# Patient Record
Sex: FEMALE | Race: WHITE | URBAN - METROPOLITAN AREA
[De-identification: names, ages, dates, MRNs, and addresses within clinical notes are randomized per-mention and may not be internally consistent; named-entity substitution may affect disease eponyms.]

---

## 2022-07-14 ENCOUNTER — OFFICE (OUTPATIENT)
Dept: URBAN - METROPOLITAN AREA TELEHEALTH 3 | Facility: TELEHEALTH | Age: 22
End: 2022-07-14

## 2022-07-14 VITALS — WEIGHT: 145 LBS | HEIGHT: 60 IN

## 2022-07-14 DIAGNOSIS — K21.9 GASTRO-ESOPHAGEAL REFLUX DISEASE WITHOUT ESOPHAGITIS: ICD-10-CM

## 2022-07-14 DIAGNOSIS — R10.13 EPIGASTRIC PAIN: ICD-10-CM

## 2022-07-14 DIAGNOSIS — R11.0 NAUSEA: ICD-10-CM

## 2022-07-14 PROCEDURE — 99204 OFFICE O/P NEW MOD 45 MIN: CPT | Performed by: PHYSICIAN ASSISTANT

## 2022-07-14 NOTE — SERVICEHPINOTES
PATIENT VERIFIED BY DATE OF BIRTH AND NAME. Patient has been consented for this telecommunication visit.   Pt is a 21 yr old female here to discuss chronic abdominal pain. She tells me that she has gone to the ER times in the past few weeks d/t the pain. Pain has been bad in the past one month. Pain is located in the epigastrium and it is continuous. Pain is worse with PO intake with associated nausea. Yesterday was the first time she vomited was yesterday. She was prescribed oxycodone and Famotidine. Her famotidine was increased from 20 to 40 mg once per day so far it hasn't helped. Has heartburn very badly but doesn't have acid reflux. No dysphagia or melena-- (had an episode of dark stool but per patient had negative FOBT in the ER). Has lost 20 lbs in the past month or so per patient. Always feels full and is never hungry. Takes Naproxen 800 mg for chronic pain from endometriosis. When she takes Naproxen, it exacerbates her pain.  Per ER notes, she has been to the ER 5 + times and to 3 + facilities in 12 months (13 ER visits in total). Her last visit 7/11/22 showed a normal CT scan with contrast, normal x ray, normal CMP, lipase, and no anemia upon CBC. No tobacco or ETOH use. She checked chest pain but it is really epigastric/luq pain-not actual chest pain. Can climb one flight of stairs w/o stopping d/t SOB. No known family hx of GI issues. ROS as per HPI and otherwise is unremarkable.

## 2022-10-05 ENCOUNTER — ON CAMPUS - OUTPATIENT (OUTPATIENT)
Dept: URBAN - METROPOLITAN AREA HOSPITAL 16 | Facility: HOSPITAL | Age: 22
End: 2022-10-05
Payer: MEDICAID

## 2022-10-05 DIAGNOSIS — K29.60 OTHER GASTRITIS WITHOUT BLEEDING: ICD-10-CM

## 2022-10-05 DIAGNOSIS — K21.9 GASTRO-ESOPHAGEAL REFLUX DISEASE WITHOUT ESOPHAGITIS: ICD-10-CM

## 2022-10-05 DIAGNOSIS — R11.0 NAUSEA: ICD-10-CM

## 2022-10-05 PROCEDURE — 43239 EGD BIOPSY SINGLE/MULTIPLE: CPT | Performed by: INTERNAL MEDICINE

## 2022-11-09 ENCOUNTER — TELEHEALTH PROVIDED IN PATIENT'S HOME (OUTPATIENT)
Dept: URBAN - METROPOLITAN AREA TELEHEALTH 3 | Facility: TELEHEALTH | Age: 22
End: 2022-11-09
Payer: MEDICAID

## 2022-11-09 VITALS — HEIGHT: 60 IN | WEIGHT: 143 LBS

## 2022-11-09 DIAGNOSIS — R10.13 EPIGASTRIC PAIN: ICD-10-CM

## 2022-11-09 DIAGNOSIS — R74.8 ABNORMAL LEVELS OF OTHER SERUM ENZYMES: ICD-10-CM

## 2022-11-09 DIAGNOSIS — R11.0 NAUSEA: ICD-10-CM

## 2022-11-09 DIAGNOSIS — R19.5 OTHER FECAL ABNORMALITIES: ICD-10-CM

## 2022-11-09 DIAGNOSIS — K21.9 GASTRO-ESOPHAGEAL REFLUX DISEASE WITHOUT ESOPHAGITIS: ICD-10-CM

## 2022-11-09 PROCEDURE — 99214 OFFICE O/P EST MOD 30 MIN: CPT | Mod: 95 | Performed by: PHYSICIAN ASSISTANT

## 2022-11-09 NOTE — SERVICEHPINOTES
PATIENT VERIFIED BY DATE OF BIRTH AND NAME. Patient has been consented for this telecommunication visit.   Pt is here for f/u regarding epigastric pain.   She has been to the ER 14 times in the past 12 months for "severe" epigastric pain. ER evaluation has been unremarkable--normal CBC, CMP, lipase, and CT scan of the ab/pelvis previously. She went again to the ER 11/4/22--imaging not done as per ER notes, she had two recent CTs there that were unremarkable. Lab work did show a slightly high ALT and total bilirubin. 
br
eneNo specific triggers to the symptoms specifically other than fast foods--she has cut out fried foods and acidic foods too. No alleviating factors. She gets nauseous a lot too as an associated symptom. Has good days and bad days. Can go days w/o eating. Can vomit up stomach acid. 
br
ene luque She tells me that for the past several months, all of her stools have been loose. She tends to go to the bathroom also when she has nausea and the epigastric pain. No blood in the stool. Has lost about 30 lbs since everything began. ene luque
We obtained an EGD on 10/5/22 which showed gastritis. It was recommended to continue Omeprazole and f/u in the office. She states that the Omeprazole didn't do anything for her stomach--took it for about 45 days in total. Hasn't tried any other antiacids. 
ene luque
No prior colonoscopy. 
ene luque
No other GI related complaints today. ROS as per HPI and otherwise is unremarkable.

## 2022-11-22 ENCOUNTER — TELEHEALTH PROVIDED OTHER THAN IN PATIENT'S HOME (OUTPATIENT)
Dept: URBAN - METROPOLITAN AREA TELEHEALTH 12 | Facility: TELEHEALTH | Age: 22
End: 2022-11-22
Payer: MEDICAID

## 2022-11-22 VITALS — WEIGHT: 143 LBS | HEIGHT: 60 IN

## 2022-11-22 DIAGNOSIS — R10.13 EPIGASTRIC PAIN: ICD-10-CM

## 2022-11-22 DIAGNOSIS — R19.5 OTHER FECAL ABNORMALITIES: ICD-10-CM

## 2022-11-22 PROCEDURE — 99214 OFFICE O/P EST MOD 30 MIN: CPT | Mod: 95 | Performed by: PHYSICIAN ASSISTANT

## 2022-11-22 NOTE — SERVICEHPINOTES
PATIENT VERIFIED BY DATE OF BIRTH AND NAME. Patient has been consented for this telecommunication visit.   Pt is here for f/u regarding epigastric pain. She has been to the ER  14 times for this.   We obtained an EGD on 10/5/22 which showed gastritis. It was recommended to continue Omeprazole and f/u in the office. She states that the Omeprazole didn't do anything for her stomach--took it for about 45 days in total. At her last visit two weeks ago, I switched her to Pantoprazole to see if that would help.  ER evaluation has been unremarkable--normal CBC, CMP, lipase, and CT scan of the ab/pelvis previously. She went again to the ER 11/4/22--imaging not done as per ER notes, she had two recent CTs there that were unremarkable No specific triggers to the symptoms specifically other than fast foods--she has cut out fried foods and acidic foods too. No alleviating factors. She gets nauseous a lot too as an associated symptom. Has good days and bad days. Can go days w/o eating. Can vomit up stomach acid. brShe tells me that for the past several months, all of her stools have been loose. She tends to go to the bathroom also when she has nausea and the epigastric pain. No blood in the stool. Has lost about 30 lbs since everything began. I ordered stool studies and a colonoscopy--results pending. I also ordered a HIDA scan with [facility] which was normal. 
br
br
Since switching to Pantoprazole, she hasn't noticed any difference in her symptoms. Did try Dicyclomine at one point 10 mg and it seemed to help. Wants to try this again. 
br
ROS as per HPI and o/w is unremarkable. No other GI related complaints today. br
br
br

## 2022-11-29 LAB
AMBIG ABBREV HFP7 DEFAULT: (no result)
BILIRUBIN, TOTAL/DIRECT, SERUM: BILIRUBIN, INDIRECT: 1.35 MG/DL — HIGH (ref 0.1–0.8)
HEPATIC FUNCTION PANEL (7): ALBUMIN: 4.8 G/DL (ref 3.9–5)
HEPATIC FUNCTION PANEL (7): ALKALINE PHOSPHATASE: 57 IU/L (ref 44–121)
HEPATIC FUNCTION PANEL (7): ALT (SGPT): 44 IU/L — HIGH (ref 0–32)
HEPATIC FUNCTION PANEL (7): AST (SGOT): 29 IU/L (ref 0–40)
HEPATIC FUNCTION PANEL (7): BILIRUBIN, DIRECT: 0.25 MG/DL (ref 0–0.4)
HEPATIC FUNCTION PANEL (7): BILIRUBIN, TOTAL: 1.6 MG/DL — HIGH (ref 0–1.2)
HEPATIC FUNCTION PANEL (7): PROTEIN, TOTAL: 7 G/DL (ref 6–8.5)

## 2022-12-02 ENCOUNTER — OFFICE (OUTPATIENT)
Dept: URBAN - METROPOLITAN AREA CLINIC 102 | Facility: CLINIC | Age: 22
End: 2022-12-02

## 2022-12-02 PROCEDURE — 00031: CPT | Performed by: INTERNAL MEDICINE

## 2022-12-19 ENCOUNTER — ON CAMPUS - OUTPATIENT (OUTPATIENT)
Dept: URBAN - METROPOLITAN AREA HOSPITAL 65 | Facility: HOSPITAL | Age: 22
End: 2022-12-19
Payer: MEDICAID

## 2022-12-19 DIAGNOSIS — R10.13 EPIGASTRIC PAIN: ICD-10-CM

## 2022-12-19 DIAGNOSIS — R19.7 DIARRHEA, UNSPECIFIED: ICD-10-CM

## 2022-12-19 PROCEDURE — 45380 COLONOSCOPY AND BIOPSY: CPT | Performed by: INTERNAL MEDICINE

## 2022-12-28 ENCOUNTER — TELEHEALTH PROVIDED OTHER THAN IN PATIENT'S HOME (OUTPATIENT)
Dept: URBAN - METROPOLITAN AREA TELEHEALTH 7 | Facility: TELEHEALTH | Age: 22
End: 2022-12-28
Payer: MEDICAID

## 2022-12-28 VITALS — HEIGHT: 60 IN | WEIGHT: 135 LBS

## 2022-12-28 DIAGNOSIS — K58.9 IRRITABLE BOWEL SYNDROME WITHOUT DIARRHEA: ICD-10-CM

## 2022-12-28 DIAGNOSIS — R10.13 EPIGASTRIC PAIN: ICD-10-CM

## 2022-12-28 DIAGNOSIS — K31.84 GASTROPARESIS: ICD-10-CM

## 2022-12-28 PROCEDURE — 99214 OFFICE O/P EST MOD 30 MIN: CPT | Mod: 95 | Performed by: INTERNAL MEDICINE

## 2022-12-28 NOTE — SERVICEHPINOTES
PATIENT VERIFIED BY DATE OF BIRTH AND NAME. Patient has been consented for this telecommunication visit.   21 yo fm with abdm pain for some time.  Pt had EGD and Colonoscopy - both normal overall.  Pt had HIDA and US negative.  Pt has N/V for some time as well.  Pt has frequent emesis.  Mostly sx are episodic.  Pt has some good days and some bad days.  Pt states limits her getting out of bed, cannot work for the most part.  Pt has a hx of endometriosis and is on Lupron for this.  Pt with hx of Sz disorder and ?Bipolar but not on meds for this.  Pt ROS stable.  Lives with her wife and father.

## 2023-10-11 ENCOUNTER — TELEHEALTH PROVIDED OTHER THAN IN PATIENT'S HOME (OUTPATIENT)
Dept: URBAN - METROPOLITAN AREA TELEHEALTH 3 | Facility: TELEHEALTH | Age: 23
End: 2023-10-11

## 2023-10-11 VITALS — WEIGHT: 125 LBS | HEIGHT: 60 IN

## 2023-10-11 DIAGNOSIS — R11.0 NAUSEA: ICD-10-CM

## 2023-10-11 DIAGNOSIS — R10.13 EPIGASTRIC PAIN: ICD-10-CM

## 2023-10-11 DIAGNOSIS — K21.9 GASTRO-ESOPHAGEAL REFLUX DISEASE WITHOUT ESOPHAGITIS: ICD-10-CM

## 2023-10-11 DIAGNOSIS — R93.89 ABNORMAL FINDINGS ON DIAGNOSTIC IMAGING OF OTHER SPECIFIED B: ICD-10-CM

## 2023-10-11 DIAGNOSIS — K76.9 LIVER DISEASE, UNSPECIFIED: ICD-10-CM

## 2023-10-11 DIAGNOSIS — R74.8 ABNORMAL LEVELS OF OTHER SERUM ENZYMES: ICD-10-CM

## 2023-10-11 PROCEDURE — 99215 OFFICE O/P EST HI 40 MIN: CPT | Mod: 95 | Performed by: PHYSICIAN ASSISTANT

## 2023-10-11 NOTE — SERVICEHPINOTES
PATIENT VERIFIED BY DATE OF BIRTH AND NAME. Patient has been consented for this telecommunication visit.   Pt is here to discuss high LFTs. Was in the ER for ab pain (has chronic ab pain)--last saw Dr. Madden--thought to be NUD vs. functional vs. Gastroparesis--he ordered a GI emptying scan which she didn't complete 2nd to vomiting both times during the test. Labs 11/22 showed a mildly high ALT at 44 and t bili was 1.6 and indirect was 1.35. However, she went to the ER 9/20/23 and labs as follows--t bili 2.0, , , and drug screen + for cannabis.  Habitually, pt goes to the ER often for ab pain--per patient, has gone about 20 times this year. The visit on 9/20/23--pain was more chronic compared to her usual pain. Vomiting spells were also more intense this most recent times. Has upper abdominal pain still. She has chronic, intermittent lack of appetite--no change here. ene luque
Regarding, high LFTs--she was told about 1 yr ago, that her lfts were also high. U/S 07/22 showed a liver lesion--could be a hemangioma but hepatic adenoma not excluded. She used to be a heavier cannabis user--now still uses daily but in less amounts. No other illicit drugs. Has been ETOH free for at least 12 months. Doesn't use any herbal supplements. No abuse of tylenol or NSAIDS. No known family hx of liver disease--though both her mom and sister are being evaluated for high LFTs. ene luque
Used to take oral OCPs--in 2019, then was on lupron, was going to see a fertility clinic but this has been put on the back burner for now d/t high LFTs. 
ene luque
No other GI related complaints today. ROS as per HPI and o/w is unremarkable.

## 2023-10-11 NOTE — SERVICENOTES
Patient's visit was conducted through [employer] telecommunication. Patient consented before the start of visit as to understanding of privacy concerns, possible technological failure, and their responsibility of carrying out instructions of plan.

## 2025-04-29 ENCOUNTER — TELEHEALTH PROVIDED IN PATIENT'S HOME (OUTPATIENT)
Dept: URBAN - METROPOLITAN AREA TELEHEALTH 12 | Facility: TELEHEALTH | Age: 25
End: 2025-04-29
Payer: COMMERCIAL

## 2025-04-29 VITALS — HEIGHT: 60 IN | WEIGHT: 155 LBS

## 2025-04-29 DIAGNOSIS — K92.9 DISEASE OF DIGESTIVE SYSTEM, UNSPECIFIED: ICD-10-CM

## 2025-04-29 DIAGNOSIS — E66.9 OBESITY, UNSPECIFIED: ICD-10-CM

## 2025-04-29 PROCEDURE — 99213 OFFICE O/P EST LOW 20 MIN: CPT | Mod: 95 | Performed by: PHYSICIAN ASSISTANT

## 2025-04-29 NOTE — SERVICENOTES
Patient was located in their home during visit., I spent 15 minutes today reviewing the chart, talking with the patient, reviewing previous results with patient, discussing plan, and documenting. Patient understands and agrees with plan. Questions/concerns addressed., Patient's visit was conducted through [employer] video telecommunication. Patient consented before the start of visit as to understanding of privacy concerns, possible technological failure, and their responsibility of carrying out instructions of plan.

## 2025-04-29 NOTE — SERVICEHPINOTES
PATIENT VERIFIED BY DATE OF BIRTH AND NAME. Patient has been consented for this telecommunication visit using [employer] application.   Pt is currently on 25 mg of amitriptyline as given to her by [facility]--they confirmed the dx of it being a functional GI disorder. It has helped her a lot. At this point, symptoms are under control with a combo of diet/lifestyle changes and then the medication. No worsening of depression or anxiety on the TCA. ROS as per HPI and o/w is unremarkable. No other GI related complaints today.